# Patient Record
(demographics unavailable — no encounter records)

---

## 2024-10-30 NOTE — PHYSICAL EXAM
[Abdominal Aorta] : Normal abdominal aorta [2+] : left 2+ [Ankle Swelling (On Exam)] : present [Ankle Swelling Bilaterally] : bilaterally  [Ankle Swelling On The Right] : mild [FreeTextEntry1] : Right hand digit contractions Patient ambulates with a rolling walker

## 2024-10-30 NOTE — HISTORY OF PRESENT ILLNESS
[FreeTextEntry1] : The patient is a 76-year-old male who presents to establish care.  The patient had a history of a fenestrated endovascular abdominal attic aneurysm repair in [place].  The patient is unsure which institution this was performed at.  He presents today to establish care as now he is residing on [place].

## 2024-10-30 NOTE — ASSESSMENT
[FreeTextEntry1] : The patient is a 76-year-old male status post fenestrated endovascular abdominal attic aneurysm repair.  I performed an aortic duplex today that shows no evidence of endoleak and his aortic sac size is 3.4 cm.  I recommend the patient follow-up in 8 months time for repeat aortic duplex if there is any change at that time I will send the patient for a CT a of his abdomen and pelvis.  Vascular surgery intervention warranted at this time.   I, Dr. Yan, personally performed the evaluation and management (E/M) services for this established patient who presents today with (a) new problem(s)/exacerbation of (an) existing condition(s). That E/M includes conducting the clinically appropriate interval history &/or exam, assessing all new/exacerbated conditions, and establishing a new plan of care. Today, my EFE, Evelin Bennett was here to observe my evaluation and management service for this new problem/exacerbated condition and follow the plan of care established by me going forward.  Thank you for allowing me to participate in the care of your patient.   Sincerely,   Derick Yan MD, RPVI, FACS Associate Professor of Surgery , Vascular Fellowship Director of Limb Salvage Surgery [facility]

## 2024-10-30 NOTE — DATA REVIEWED
[FreeTextEntry1] : Aortic duplex performed in my office today shows a patent endovascular aortic stent graft with an aneurysm sac size at 3.4 cm and no evidence of endoleak visualized